# Patient Record
Sex: MALE | Race: WHITE
[De-identification: names, ages, dates, MRNs, and addresses within clinical notes are randomized per-mention and may not be internally consistent; named-entity substitution may affect disease eponyms.]

---

## 2018-04-13 ENCOUNTER — HOSPITAL ENCOUNTER (EMERGENCY)
Dept: HOSPITAL 80 - FED | Age: 78
Discharge: HOME | End: 2018-04-13
Payer: COMMERCIAL

## 2018-04-13 ENCOUNTER — HOSPITAL ENCOUNTER (OUTPATIENT)
Dept: HOSPITAL 80 - FIMAGING | Age: 78
End: 2018-04-13
Attending: FAMILY MEDICINE
Payer: COMMERCIAL

## 2018-04-13 VITALS — DIASTOLIC BLOOD PRESSURE: 102 MMHG | SYSTOLIC BLOOD PRESSURE: 168 MMHG

## 2018-04-13 DIAGNOSIS — K42.9: ICD-10-CM

## 2018-04-13 DIAGNOSIS — K76.89: ICD-10-CM

## 2018-04-13 DIAGNOSIS — R10.84: ICD-10-CM

## 2018-04-13 DIAGNOSIS — K66.8: Primary | ICD-10-CM

## 2018-04-13 DIAGNOSIS — I70.0: ICD-10-CM

## 2018-04-13 DIAGNOSIS — K57.10: ICD-10-CM

## 2018-04-13 DIAGNOSIS — E86.9: ICD-10-CM

## 2018-04-13 DIAGNOSIS — M51.36: ICD-10-CM

## 2018-04-13 DIAGNOSIS — Z96.642: ICD-10-CM

## 2018-04-13 DIAGNOSIS — M43.16: ICD-10-CM

## 2018-04-13 DIAGNOSIS — R14.0: Primary | ICD-10-CM

## 2018-04-13 LAB — PLATELET # BLD: 170 10^3/UL (ref 150–400)

## 2018-04-13 PROCEDURE — 74177 CT ABD & PELVIS W/CONTRAST: CPT

## 2018-04-13 NOTE — EDPHY
HPI/HX/ROS/PE/MDM


Narrative: 


CHIEF COMPLAINT:  Free air on abdominal CT





HISTORY OF PRESENT ILLNESS:   


The patient is a 78 y/o male arriving at the request of his PCP, Dr. George, 

after there was free air discovered on his abdominal CT today. For the past 

several years, he has had intermittent episodes of abdominal pain and bloating 

that last for several days. 6 weeks ago he developed abdominal bloating, pain, 

and an urgency to have a bowel movement with minimal stool and water in the 

bowel movement. He saw his PCP, Dr. George, who had no clear diagnoses. The 

symptoms did not improve so he saw Dr. George on 04/05/18, 8 days ago, who 

ordered an abdominal CT. The CT was performed today and had the abnormal 

findings. Last night his pain exacerbated. Eating food does not have any affect 

on his symptoms. Denies taking medications for the discomfort. His last 

colonoscopy was 7 years ago and was normal.





No fever, chills, chest pain, shortness of breath, palpitations, vomiting, 

urinary complaints, headache, lightheadedness. 





REVIEW OF SYSTEMS:


Aside from elements discussed in the HPI, a comprehensive 10-point review of 

systems was reviewed and is negative.





PAST MEDICAL HISTORY:  Laser surgery on prostate 





SOCIAL HISTORY: Wife at bedside, lives in [place], retired, prior smoker, 

drinks a small amount of wine at dinner





VITAL SIGNS: Reviewed by me


GENERAL: Mildly tearful, upset regarding diagnosis of free air.  Well-developed

, well-nourished, resting comfortably in no respiratory distress.


HEENT: Atraumatic. Eyes: No icterus, no injection. Mouth: moist mucous 

membranes.  No erythema or lesions. Neck: supple with no adenopathy.


LUNGS: Clear to auscultation bilaterally, no wheezes, rhonchi or rales.


CARDIAC: Regular rate and rhythm, no rubs, murmurs or gallops.


ABDOMEN: Mild epigastric, RLQ, and RUQ tenderness. Soft, nondistended, no 

guarding or rebound, bowel sounds normal.


BACK:  No CVA tenderness.


EXTREMITIES: No trauma. No edema.  Range of motion is normal throughout.


NEURO: Alert and oriented,  grossly nonfocal.  


SKIN: Warm and dry, no rash.


PSYCHIATRIC: Normal mentation, no agitation.





Portions of this note were transcribed by a medical scribe.  I personally 

performed a history, physical exam, medical decision making, and confirmed 

accuracy of information the transcribed note.





ED Course: 


The patient is a 78 y/o male arriving at the request of his PCP, Dr. George, 

after there was free air discovered on his abdominal CT today. For the past 

month he has experienced abdominal bloating, pain, and an urgency to have a 

bowel movement.  He has mild epigastric, RLQ, and RUQ tenderness. No guarding 

or rebound. Labs ordered. 1L IV NS administered; denies medications at this 

time.





1941: Consulted with Dr. Barrett, general surgeon, regarding this patient. 


2024: Patient examined by Dr. Barrett.  Exam quite benign, labs normal, no 

clear cause of the free air on CT scan.  Discussed options with patient and 

feels patient is safe to go home. He will need a colonoscopy soon and will be 

given a prescription for Bentyl.





2055: Reassessed patient and discussed plan for colonoscopy and Bentyl 

prescription. I have also advised him to follow up with Dr. Barrett and Dr. George. Return precautions provided; patient is comfortable with this plan.





He and wife understand reasons to return to the ED including increased pain, 

fever, vomiting, diarrhea, other concerns.





MDM: 





Diff dx considered included perforated viscus, appendicitis with perforation 

and abscess, microperforation of diverticula, bowel obstruction, abdominal pain.





- Data Points


Laboratory Results: 


 Laboratory Results





 04/13/18 19:07 





 04/13/18 19:07 








Medications Given: 


 








Discontinued Medications





Sodium Chloride (Ns)  1,000 mls @ 0 mls/hr IV ONCE ONE; Wide Open


   PRN Reason: Protocol


   Stop: 04/13/18 18:45


   Last Admin: 04/13/18 19:05 Dose:  1,000 mls








General


Time Seen by Provider: 04/13/18 18:28


Initial Vital Signs: 


 Initial Vital Signs











Temperature (C)  37.0 C   04/13/18 18:31


 


Heart Rate  74   04/13/18 18:31


 


Respiratory Rate  16   04/13/18 18:31


 


Blood Pressure  156/91 H  04/13/18 18:31


 


O2 Sat (%)  95   04/13/18 18:31








 











O2 Delivery Mode               Room Air














Allergies/Adverse Reactions: 


 





No Known Allergies Allergy (Verified 12/14/15 11:02)


 








Home Medications: 














 Medication  Instructions  Recorded


 


Dicyclomine [Bentyl 20 MG (*)] 20 mg PO QID PRN #40 tab 04/13/18














Departure





- Departure


Disposition: Home, Routine, Self-Care


Clinical Impression: 


 Intra-abdominal free air of unknown etiology





Abdominal pain


Qualifiers:


 Abdominal location: generalized Qualified Code(s): R10.84 - Generalized 

abdominal pain





Condition: Good


Instructions:  Acute Abdominal Pain (ED)


Additional Instructions: 


You need to schedule a colonoscopy as soon as possible.





Take Bentyl as directed.





Follow up with Dr. Barrett, general surgeon.





Follow up with Dr. George. 





Return to the Emergency Department if you develop fever, worsening pain, or 

vomiting.





Referrals: 


Aleena George MD [Primary Care Provider] - As per Instructions


Gelacio Barrett MD [Medical Doctor] - As per Instructions


Prescriptions: 


Dicyclomine [Bentyl 20 MG (*)] 20 mg PO QID PRN #40 tab


 PRN Reason: abdominal pain, bloating


Report Scribed for: Glenna Bautista


Report Scribed by: Shawanda Lambret


Date of Report: 04/13/18


Time of Report: 18:35

## 2018-04-15 NOTE — GCON
[f rep st]



                                                                    CONSULTATION





DATE OF CONSULTATION:  04/13/2018



CHIEF COMPLAINT:  Abdominal pain and CT scan findings.



HISTORY OF PRESENT ILLNESS:  This is a 77-year-old male, who arrived at the emergency department at t
he request of his primary care physician.  Briefly, the patient has been seeing his primary care phys
jillian and complained of actually multiple years of abdominal distention, fullness, vague epigastric p
ain with symptoms consistent with IBS, including multiple usually loose stools throughout the day.  B
ecause of this, his PCP wanted to work this up and subsequently ordered a CT scan.  CT scan came back
 earlier today showing some small amount of free air around the cecum with some stranding of the chema
cent mesentery.  Because of this, he was requested to report to the emergency department.  In the [facility] department, he does complain of some upper epigastric bloating and occasional pain, but other 
than that, states that he currently feels fine.  We had a long discussion regarding his myriad of abd
ominal complaints and it sounds like he has had issues for a long time.  He has been treated for H py
blane in the past.  These symptoms are not consistent with that at all.  In the past, he had excruciat
ing pain.  This is more of a vague bloating type pain, which does not radiate.  It gets better occasi
onally with antacids, but usually tends to wax and wane throughout the day.  He denies having any fev
ers or chills.  His last bowel movement was earlier today and was described as loose.  He continues t
o tolerate a diet without any problems.  He is also scheduled to have a colonoscopy in the near [facility].  He has had a recent upper endoscopy which showed some gastritis, but no other significant finding
s.



PAST MEDICAL HISTORY:  Hard of hearing and prostate hyperplasia and history of H pylori treated.



PAST SURGICAL HISTORY:  Laser surgery to the prostate.  No previous abdominal surgeries.



FAMILY HISTORY:  Noncontributory.



SOCIAL HISTORY:  The patient lives in [place].  He is retired.  He has occasional wine with dinner.  
He denies smoking or any illicit drug use.



REVIEW OF SYSTEMS:  A full 10-point review was performed.



PHYSICAL EXAM:  VITAL SIGNS:  Temperature 37, blood pressure 156/91, heart rate 74 and he is 95% on r
oom air.  CONSTITUTIONAL:  He is in no apparent distress and appears comfortable.  EYES:  His pupils 
are equal, round, and reactive to light and accommodation.  His extraocular movements are intact.  EA
RS, NOSE, MOUTH, THROAT:  He has moist mucous membranes.  His hearing is normal.  He does use hearing
 aids.  He has no oral mucosal ulcers.  CARDIOVASCULAR:  He has a regular rate and rhythm without any
 murmurs.  RESPIRATORY:  He has  no respiratory distress, rales, or rhonchi.  GI:  He has normoactive
 bowel sounds.  ABDOMEN:  Soft, nondistended, nontender.  There is no rebound tenderness or guarding.
  He is completely nontender to palpation.  SKIN:  Warm.  Normal color.  No rashes or abrasions.  MUS
CULOSKELETAL:  Full strength.  No tenderness.  Normal joint range of motion without weakness.  NEUROL
OGIC:  He is alert and oriented x3.  His cranial nerves 2-12 are intact.  He has no weakness, numbnes
s or asterixis.  PSYCH:  He is interacting appropriately.  He is not anxious or encephalopathic and h
is thought process is linear.  LYMPH/HEME/IMMUNOLOGIC:  He has no cervical groin or supraclavicular l
ymphadenopathy appreciated.



LABORATORY DATA:  White blood cell count normal at 4, no left shift.  H and H stable at 15 and 45.  C
hemistry is unremarkable.  The UA was negative.



IMAGING:  Includes a CT scan of his abdomen and pelvis with IV contrast, the images of which were per
sonally reviewed by me with the Radiologist.  It does show some small 1-2 mm of air adjacent to the c
ecum in the mesentery in the right lower quadrant.  The mesentery also looks somewhat inflamed.  Ther
e is no other suspicious findings including a nonvisualized appendix.  No stranding and/or wall thick
ening of the adjacent bowel.  No other significant findings in the patient's abdomen.



ASSESSMENT AND PLAN:  A 77-year-old male with incidental CT scan findings of a small amount of free a
ir on his imaging.  After my examination and meeting with the patient in the emergency department, I 
feel as though his long-standing complaints of indigestion and bloating likely have nothing to do wit
h the incidental findings on CT scan.  The small amount of free air there I believe is nonpathologic,
 as I see no other signs of perforation and his exam is 100% benign.  I gave him the option of operat
monica exploration as he did have some free air.  He declined this.  I feel that the more prudent manage
ment would be to continue to watch this.  I did give him some strict return precautions, but I feel a
s though we would get more diagnostic information from him seeing a gastroenterologist, perhaps kwesi
ting an upper and lower endoscopy.  I do feel that most of his symptoms are likely to long-standing i
rritable bowel syndrome and have asked the ED physician to start him on some medication to help with 
that long term.  I have also asked the patient to follow up with me and to call should he have any qu
estions or concerns sooner, but I do not feel that operative exploration is warranted at this time.





Job #:  [id number]